# Patient Record
Sex: MALE | Race: ASIAN | NOT HISPANIC OR LATINO
[De-identification: names, ages, dates, MRNs, and addresses within clinical notes are randomized per-mention and may not be internally consistent; named-entity substitution may affect disease eponyms.]

---

## 2017-12-06 PROBLEM — Z00.00 ENCOUNTER FOR PREVENTIVE HEALTH EXAMINATION: Status: ACTIVE | Noted: 2017-12-06

## 2017-12-18 ENCOUNTER — APPOINTMENT (OUTPATIENT)
Dept: NUCLEAR MEDICINE | Facility: HOSPITAL | Age: 42
End: 2017-12-18
Payer: MEDICAID

## 2017-12-18 ENCOUNTER — OUTPATIENT (OUTPATIENT)
Dept: OUTPATIENT SERVICES | Facility: HOSPITAL | Age: 42
LOS: 1 days | End: 2017-12-18

## 2017-12-18 DIAGNOSIS — E05.90 THYROTOXICOSIS, UNSPECIFIED WITHOUT THYROTOXIC CRISIS OR STORM: ICD-10-CM

## 2017-12-19 ENCOUNTER — APPOINTMENT (OUTPATIENT)
Dept: NUCLEAR MEDICINE | Facility: HOSPITAL | Age: 42
End: 2017-12-19

## 2017-12-19 PROCEDURE — 78014 THYROID IMAGING W/BLOOD FLOW: CPT | Mod: 26

## 2018-01-03 ENCOUNTER — APPOINTMENT (OUTPATIENT)
Dept: NUCLEAR MEDICINE | Facility: HOSPITAL | Age: 43
End: 2018-01-03
Payer: MEDICAID

## 2018-01-03 ENCOUNTER — OUTPATIENT (OUTPATIENT)
Dept: OUTPATIENT SERVICES | Facility: HOSPITAL | Age: 43
LOS: 1 days | End: 2018-01-03

## 2018-01-03 DIAGNOSIS — E05.00 THYROTOXICOSIS WITH DIFFUSE GOITER WITHOUT THYROTOXIC CRISIS OR STORM: ICD-10-CM

## 2018-01-03 PROCEDURE — 79005 NUCLEAR RX ORAL ADMIN: CPT | Mod: 26

## 2018-04-24 ENCOUNTER — EMERGENCY (EMERGENCY)
Facility: HOSPITAL | Age: 43
LOS: 1 days | Discharge: HOME | End: 2018-04-24
Attending: EMERGENCY MEDICINE | Admitting: EMERGENCY MEDICINE
Payer: MEDICAID

## 2018-04-24 VITALS
RESPIRATION RATE: 16 BRPM | HEART RATE: 84 BPM | SYSTOLIC BLOOD PRESSURE: 144 MMHG | TEMPERATURE: 98 F | OXYGEN SATURATION: 100 % | DIASTOLIC BLOOD PRESSURE: 84 MMHG

## 2018-04-24 LAB
ALBUMIN SERPL ELPH-MCNC: 4.9 G/DL — SIGNIFICANT CHANGE UP (ref 3.3–5)
ALP SERPL-CCNC: 103 U/L — SIGNIFICANT CHANGE UP (ref 40–120)
ALT FLD-CCNC: 64 U/L — HIGH (ref 4–41)
APPEARANCE UR: CLEAR — SIGNIFICANT CHANGE UP
AST SERPL-CCNC: 48 U/L — HIGH (ref 4–40)
BACTERIA # UR AUTO: SIGNIFICANT CHANGE UP
BASOPHILS # BLD AUTO: 0.07 K/UL — SIGNIFICANT CHANGE UP (ref 0–0.2)
BASOPHILS NFR BLD AUTO: 0.8 % — SIGNIFICANT CHANGE UP (ref 0–2)
BILIRUB SERPL-MCNC: 0.5 MG/DL — SIGNIFICANT CHANGE UP (ref 0.2–1.2)
BILIRUB UR-MCNC: NEGATIVE — SIGNIFICANT CHANGE UP
BLOOD UR QL VISUAL: NEGATIVE — SIGNIFICANT CHANGE UP
BUN SERPL-MCNC: 10 MG/DL — SIGNIFICANT CHANGE UP (ref 7–23)
CALCIUM SERPL-MCNC: 9.3 MG/DL — SIGNIFICANT CHANGE UP (ref 8.4–10.5)
CHLORIDE SERPL-SCNC: 99 MMOL/L — SIGNIFICANT CHANGE UP (ref 98–107)
CK SERPL-CCNC: 627 U/L — HIGH (ref 30–200)
CO2 SERPL-SCNC: 26 MMOL/L — SIGNIFICANT CHANGE UP (ref 22–31)
COLOR SPEC: SIGNIFICANT CHANGE UP
CREAT SERPL-MCNC: 0.96 MG/DL — SIGNIFICANT CHANGE UP (ref 0.5–1.3)
EOSINOPHIL # BLD AUTO: 0.38 K/UL — SIGNIFICANT CHANGE UP (ref 0–0.5)
EOSINOPHIL NFR BLD AUTO: 4.5 % — SIGNIFICANT CHANGE UP (ref 0–6)
GLUCOSE SERPL-MCNC: 95 MG/DL — SIGNIFICANT CHANGE UP (ref 70–99)
GLUCOSE UR-MCNC: NEGATIVE — SIGNIFICANT CHANGE UP
HBA1C BLD-MCNC: 5.2 % — SIGNIFICANT CHANGE UP (ref 4–5.6)
HCT VFR BLD CALC: 49.1 % — SIGNIFICANT CHANGE UP (ref 39–50)
HGB BLD-MCNC: 16 G/DL — SIGNIFICANT CHANGE UP (ref 13–17)
IMM GRANULOCYTES # BLD AUTO: 0.02 # — SIGNIFICANT CHANGE UP
IMM GRANULOCYTES NFR BLD AUTO: 0.2 % — SIGNIFICANT CHANGE UP (ref 0–1.5)
KETONES UR-MCNC: NEGATIVE — SIGNIFICANT CHANGE UP
LEUKOCYTE ESTERASE UR-ACNC: NEGATIVE — SIGNIFICANT CHANGE UP
LIDOCAIN IGE QN: 23.2 U/L — SIGNIFICANT CHANGE UP (ref 7–60)
LYMPHOCYTES # BLD AUTO: 1.92 K/UL — SIGNIFICANT CHANGE UP (ref 1–3.3)
LYMPHOCYTES # BLD AUTO: 22.6 % — SIGNIFICANT CHANGE UP (ref 13–44)
MCHC RBC-ENTMCNC: 27.3 PG — SIGNIFICANT CHANGE UP (ref 27–34)
MCHC RBC-ENTMCNC: 32.6 % — SIGNIFICANT CHANGE UP (ref 32–36)
MCV RBC AUTO: 83.6 FL — SIGNIFICANT CHANGE UP (ref 80–100)
MONOCYTES # BLD AUTO: 0.52 K/UL — SIGNIFICANT CHANGE UP (ref 0–0.9)
MONOCYTES NFR BLD AUTO: 6.1 % — SIGNIFICANT CHANGE UP (ref 2–14)
NEUTROPHILS # BLD AUTO: 5.58 K/UL — SIGNIFICANT CHANGE UP (ref 1.8–7.4)
NEUTROPHILS NFR BLD AUTO: 65.8 % — SIGNIFICANT CHANGE UP (ref 43–77)
NITRITE UR-MCNC: NEGATIVE — SIGNIFICANT CHANGE UP
NRBC # FLD: 0 — SIGNIFICANT CHANGE UP
PH UR: 7 — SIGNIFICANT CHANGE UP (ref 4.6–8)
PLATELET # BLD AUTO: 307 K/UL — SIGNIFICANT CHANGE UP (ref 150–400)
PMV BLD: 10.8 FL — SIGNIFICANT CHANGE UP (ref 7–13)
POTASSIUM SERPL-MCNC: 4.1 MMOL/L — SIGNIFICANT CHANGE UP (ref 3.5–5.3)
POTASSIUM SERPL-SCNC: 4.1 MMOL/L — SIGNIFICANT CHANGE UP (ref 3.5–5.3)
PROT SERPL-MCNC: 8.2 G/DL — SIGNIFICANT CHANGE UP (ref 6–8.3)
PROT UR-MCNC: NEGATIVE MG/DL — SIGNIFICANT CHANGE UP
RBC # BLD: 5.87 M/UL — HIGH (ref 4.2–5.8)
RBC # FLD: 13.3 % — SIGNIFICANT CHANGE UP (ref 10.3–14.5)
RBC CASTS # UR COMP ASSIST: SIGNIFICANT CHANGE UP (ref 0–?)
SODIUM SERPL-SCNC: 137 MMOL/L — SIGNIFICANT CHANGE UP (ref 135–145)
SP GR SPEC: 1.01 — SIGNIFICANT CHANGE UP (ref 1–1.04)
SQUAMOUS # UR AUTO: SIGNIFICANT CHANGE UP
T3 SERPL-MCNC: < 30 NG/DL — LOW (ref 80–200)
T4 AB SER-ACNC: 0.57 UG/DL — LOW (ref 5.1–13)
TROPONIN T SERPL-MCNC: < 0.06 NG/ML — SIGNIFICANT CHANGE UP (ref 0–0.06)
TROPONIN T SERPL-MCNC: < 0.06 NG/ML — SIGNIFICANT CHANGE UP (ref 0–0.06)
TSH SERPL-MCNC: 89.71 UIU/ML — HIGH (ref 0.27–4.2)
UROBILINOGEN FLD QL: NORMAL MG/DL — SIGNIFICANT CHANGE UP
WBC # BLD: 8.49 K/UL — SIGNIFICANT CHANGE UP (ref 3.8–10.5)
WBC # FLD AUTO: 8.49 K/UL — SIGNIFICANT CHANGE UP (ref 3.8–10.5)
WBC UR QL: SIGNIFICANT CHANGE UP (ref 0–?)

## 2018-04-24 PROCEDURE — 71046 X-RAY EXAM CHEST 2 VIEWS: CPT | Mod: 26

## 2018-04-24 PROCEDURE — 99220: CPT | Mod: 25

## 2018-04-24 PROCEDURE — 93010 ELECTROCARDIOGRAM REPORT: CPT | Mod: 59

## 2018-04-24 RX ORDER — KETOROLAC TROMETHAMINE 30 MG/ML
15 SYRINGE (ML) INJECTION ONCE
Qty: 0 | Refills: 0 | Status: DISCONTINUED | OUTPATIENT
Start: 2018-04-24 | End: 2018-04-24

## 2018-04-24 RX ORDER — PROPRANOLOL HCL 160 MG
0 CAPSULE, EXTENDED RELEASE 24HR ORAL
Qty: 0 | Refills: 0 | COMMUNITY

## 2018-04-24 RX ORDER — AMLODIPINE BESYLATE 2.5 MG/1
0 TABLET ORAL
Qty: 0 | Refills: 0 | COMMUNITY

## 2018-04-24 RX ORDER — ASPIRIN/CALCIUM CARB/MAGNESIUM 324 MG
162 TABLET ORAL ONCE
Qty: 0 | Refills: 0 | Status: COMPLETED | OUTPATIENT
Start: 2018-04-24 | End: 2018-04-24

## 2018-04-24 RX ADMIN — Medication 162 MILLIGRAM(S): at 08:38

## 2018-04-24 RX ADMIN — Medication 15 MILLIGRAM(S): at 08:50

## 2018-04-24 RX ADMIN — Medication 15 MILLIGRAM(S): at 08:38

## 2018-04-24 NOTE — ED CDU PROVIDER INITIAL DAY NOTE - ATTENDING CONTRIBUTION TO CARE
I performed a face to face bedside interview with patient regarding history of present illness, review of symptoms and past medical history. I completed an independent physical exam.  I have discussed patient's plan of care with PA.   I agree with note as stated above, having amended the EMR as needed to reflect my findings. I have discussed the assessment and plan of care.  This includes during the time I functioned as the attending physician for this patient.    ATTENDING PHYSICAL EXAM IS DOCUMENTED IN THE BODY OF THE NOTE.

## 2018-04-24 NOTE — ED PROVIDER NOTE - CONSTITUTIONAL, MLM
Well appearing, well nourished, awake, alert, oriented to person, place, time/situation and in no apparent distress. normal... well nourished, awake, alert, oriented to person, place, time/situation and in no apparent distress.

## 2018-04-24 NOTE — ED PROVIDER NOTE - MEDICAL DECISION MAKING DETAILS
-impression: symptoms likely due to MSK pain. heart score 2 if trop wnl. pt PERC's out. will send to cdu for stress test since pt of [place] origin with atypical cp  -initial plan: labs, EKG, CXR, symptomatic treatment w/ analgesics, IVF -reassess, -consider cdu for stress test since pt of [place] origin with atypical cp -impression: atypical cp likely due to MSK pain. heart score 2 if trop wnl. pt PERC's out, wells score 0. bp 139/105 L arm, bp R arm 140/100 R arm.   -initial plan: labs, EKG, CXR, symptomatic treatment w/ analgesics, IVF -reassess, -consider cdu for stress test since pt of [place] origin with atypical cp

## 2018-04-24 NOTE — ED PROVIDER NOTE - OBJECTIVE STATEMENT
41yo M pmh htn, hyperthyroidism p/w high blood pressure. pt was sleeping, woke up diaphoretic with R sided paracervical pain & L sided cp. checked his bp & it was 137/97 (baseline bp 129/87) so came to ED. for past 1 year pt has been having L sided cp, intermittent. Cp is not pleuritic, not affected by exertion. denies f/c, cough, rhinorrhea, sob, hx dvt/pe, fhx cardiac dz.

## 2018-04-24 NOTE — ED CDU PROVIDER INITIAL DAY NOTE - OBJECTIVE STATEMENT
43yo M with PMH significant for htn, hyperthyroidism presented to ER concerned about BP but also revealed that he has had L sided chest pain off and on for past year, non radiating.  No cough, no shortness of breath, no fever/chills, no other complaints.

## 2018-04-24 NOTE — ED PROVIDER NOTE - ATTENDING CONTRIBUTION TO CARE
42M c/o cp, diaphoresis, slight elevation in bp  -agree with hx and pe as above documented by Dr Zeng  -given Mr Syed's age, hx htn, and [place] ethnicity I think he needs obs for stress, serial ce, arrythmia monitoring to r/o MACE.

## 2018-04-24 NOTE — ED CDU PROVIDER INITIAL DAY NOTE - PROGRESS NOTE DETAILS
Pt signed out to me by CLAUDIA Vides: 42yM w/pmhx HTN, hyperthyroid presented to ED with concern for elevated BP at home with b/l chest pain. Pt reports hx of left sided chest pain for the past year intermittenly. Currently resting in bed, VSS, no events on tele, no chest pain at this time, no other complaints. Will continue to monitor pt, pending stress test in AM

## 2018-04-24 NOTE — ED ADULT NURSE NOTE - OBJECTIVE STATEMENT
Pt a&ox3 c/o Lt sided chest pain 5/10 pain, squeezing, denies sob, denies trauma, heavy lifting, breathing even and unlabored, no headache/dizziness, abd soft, non-tender, non-distended. Skin is cool dry and intact. IVL placed, labs sent. WIll continue to monitor.

## 2018-04-25 VITALS
DIASTOLIC BLOOD PRESSURE: 78 MMHG | OXYGEN SATURATION: 100 % | TEMPERATURE: 98 F | HEART RATE: 72 BPM | SYSTOLIC BLOOD PRESSURE: 134 MMHG | RESPIRATION RATE: 16 BRPM

## 2018-04-25 LAB — CK SERPL-CCNC: 461 U/L — HIGH (ref 30–200)

## 2018-04-25 PROCEDURE — 99217: CPT

## 2018-04-25 PROCEDURE — 93018 CV STRESS TEST I&R ONLY: CPT | Mod: GC

## 2018-04-25 PROCEDURE — 93016 CV STRESS TEST SUPVJ ONLY: CPT | Mod: GC

## 2018-04-25 PROCEDURE — 78452 HT MUSCLE IMAGE SPECT MULT: CPT | Mod: 26

## 2018-04-25 RX ORDER — SODIUM CHLORIDE 9 MG/ML
1000 INJECTION INTRAMUSCULAR; INTRAVENOUS; SUBCUTANEOUS ONCE
Qty: 0 | Refills: 0 | Status: COMPLETED | OUTPATIENT
Start: 2018-04-25 | End: 2018-04-25

## 2018-04-25 RX ORDER — ACETAMINOPHEN 500 MG
650 TABLET ORAL ONCE
Qty: 0 | Refills: 0 | Status: DISCONTINUED | OUTPATIENT
Start: 2018-04-25 | End: 2018-04-25

## 2018-04-25 RX ORDER — ACETAMINOPHEN 500 MG
650 TABLET ORAL ONCE
Qty: 0 | Refills: 0 | Status: COMPLETED | OUTPATIENT
Start: 2018-04-25 | End: 2018-04-25

## 2018-04-25 RX ADMIN — Medication 650 MILLIGRAM(S): at 07:17

## 2018-04-25 RX ADMIN — SODIUM CHLORIDE 1000 MILLILITER(S): 9 INJECTION INTRAMUSCULAR; INTRAVENOUS; SUBCUTANEOUS at 10:22

## 2018-04-25 RX ADMIN — Medication 650 MILLIGRAM(S): at 06:17

## 2018-04-25 NOTE — ED CDU PROVIDER SUBSEQUENT DAY NOTE - MEDICAL DECISION MAKING DETAILS
42yM w/pmhx HTN and hyperthyroid presented to the ED with concern for elevated blood pressure and intermittent left sided chest pain x 1 year. Two sets negative trop, no events on tele, pending stress test in the morning.

## 2018-04-25 NOTE — ED CDU PROVIDER DISPOSITION NOTE - PLAN OF CARE
You were seen in the ED for chest pain. Your nuclear stress test was normal. (1) Please take your medications regularly, keep a log of your blood pressure, and follow-up with Dr. Acuna within the next 1 wk. Bring your test results and blood pressure log. (2) Please follow-up with your Endocrinologist. Please take your test results with you and discuss whether you should continue the Propanolol medication following the thyroid procedure.  (3) Seek immediate medical care for any new/worsening signs or symptoms.

## 2018-04-25 NOTE — ED CDU PROVIDER DISPOSITION NOTE - CLINICAL COURSE
17:08 Pasquale att: 42 [place] male h/o htn, hyperthryoid 1 mo s/p radio ablation, p/w bilateral cp, CDU for tele, CE#2 and nuc stress test. Past one year patient notes intermitt left sided chest pain, occassional radiation to right side chest. Non-exertional. Two nights before patient was asleep when he noted left sided chest pain, neg sob, took his bp which he noted to be high, and gave himself amlodipine 2.5 mg po. Of note patient was prescribed propanolol TID for hyperthryoidism, dose not discontinued since radioablation, patient denies cp associated with taking propanolol post ablation. PE nad, aaox3, ctabl, rrr, s1s2, abd soft ntnd, neg le edema. Labs significant for elevated ck that cleared with fluids, 2 negative troponins. Nuc stress negative. Per patient request, [facility] left with [facility] to facilitate prompt 1-2 w follow-up. Results, dc instructions, return precautions, and need for follow-up reviewed with patient. Stable dc home.

## 2018-04-25 NOTE — ED CDU PROVIDER SUBSEQUENT DAY NOTE - HISTORY
42yM w/pmhx HTN, hyperthyroid presented to ED with concern for elevated BP at home with b/l chest pain. Pt reports hx of left sided chest pain for the past year intermittently, non-radiating. Yesterday morning he woke up sweaty with chest pain.   Currently resting in bed, VSS, no events on tele, no chest pain at this time, no other complaints. Will continue to monitor pt, pending stress test in AM.

## 2018-04-25 NOTE — ED CDU PROVIDER SUBSEQUENT DAY NOTE - ATTENDING CONTRIBUTION TO CARE
Dr. Giordano: I performed a face to face bedside interview with patient regarding history of present illness, review of symptoms and past medical history. I completed an independent physical exam.  I have discussed patient's plan of care with PA.   I agree with note as stated above, having amended the EMR as needed to reflect my findings.   This includes HISTORY OF PRESENT ILLNESS, HIV, PAST MEDICAL/SURGICAL/FAMILY/SOCIAL HISTORY, ALLERGIES AND HOME MEDICATIONS, REVIEW OF SYSTEMS, PHYSICAL EXAM, and any PROGRESS NOTES during the time I functioned as the attending physician for this patient. 42 [place]  male h/o htn, hyperthryoid 1 mo s/p radio ablation, p/w bilateral cp, CDU for tele, CE#2 and nuc stress test. Past one year patient notes intermitt left sided chest pain, occassional radiation to right side chest. Non-exertional. Two nights before patient was asleep when he noted left sided chest pain, neg sob, took his bp which he noted to be high, and gave himself amlodipine 2.5 mg po. Of note patient was prescribed propanolol TID for hyperthryoidism, dose not discontinued since radioablation, patient denies cp associated with taking propanolol post ablation. PE nad, aaox3, ctabl, rrr, s1s2, abd soft ntnd, neg le edema.

## 2020-01-03 ENCOUNTER — EMERGENCY (EMERGENCY)
Facility: HOSPITAL | Age: 45
LOS: 1 days | Discharge: HOME | End: 2020-01-03
Attending: EMERGENCY MEDICINE | Admitting: EMERGENCY MEDICINE
Payer: MEDICAID

## 2020-01-03 VITALS
RESPIRATION RATE: 18 BRPM | OXYGEN SATURATION: 100 % | DIASTOLIC BLOOD PRESSURE: 109 MMHG | SYSTOLIC BLOOD PRESSURE: 153 MMHG | HEART RATE: 88 BPM | TEMPERATURE: 98 F

## 2020-01-03 PROCEDURE — 99283 EMERGENCY DEPT VISIT LOW MDM: CPT

## 2020-01-03 NOTE — ED ADULT TRIAGE NOTE - CHIEF COMPLAINT QUOTE
Pt. c/o HTN, took his blood pressure at home and was "135/100," saw his PMD yesterday, given 12.5mg hydrochlorothiazide and took it this morning around 6 am. Endorses intermittent generalized headache. Denies vision changes. PMH: HTN.

## 2020-01-04 VITALS
DIASTOLIC BLOOD PRESSURE: 95 MMHG | TEMPERATURE: 98 F | RESPIRATION RATE: 16 BRPM | OXYGEN SATURATION: 100 % | SYSTOLIC BLOOD PRESSURE: 138 MMHG | HEART RATE: 96 BPM

## 2020-01-04 PROBLEM — E05.90 THYROTOXICOSIS, UNSPECIFIED WITHOUT THYROTOXIC CRISIS OR STORM: Chronic | Status: ACTIVE | Noted: 2018-04-24

## 2020-01-04 PROBLEM — I10 ESSENTIAL (PRIMARY) HYPERTENSION: Chronic | Status: ACTIVE | Noted: 2018-04-24

## 2020-01-04 NOTE — ED PROVIDER NOTE - PATIENT PORTAL LINK FT
You can access the FollowMyHealth Patient Portal offered by [facility] by registering at the following website: [email or web address]. By joining [employer]’s FollowMyHealth portal, you will also be able to view your health information using other applications (apps) compatible with our system.

## 2020-01-04 NOTE — ED PROVIDER NOTE - OBJECTIVE STATEMENT
44 yr old M c non contrib PMHx who p/w concern for elevated BP.  States SBP at home was 135 mmHg so he came in.  Went to PMD yesterday and was started on HCTZ for SBPs around 140 at home. Has never been on any other BP meds.  Is otherwise well.  No SOB, no CP, no HA.  No fever/chills. No N/V/D. No edema.

## 2020-01-04 NOTE — ED ADULT NURSE NOTE - OBJECTIVE STATEMENT
Patient received to room 8, ambulatory, c/o hypertension. Primarily seen by MD Bourne. Vital signs stable. Patient discharged by MD.

## 2020-01-04 NOTE — ED PROVIDER NOTE - CLINICAL SUMMARY MEDICAL DECISION MAKING FREE TEXT BOX
Pt pw concern for mildly elevated BP.  Is asymptomatic.  Has no focal findings on exam.  I advised pt to f/u c PMD and cotinue taking meds.  Return precautions discussed.

## 2020-01-04 NOTE — ED PROVIDER NOTE - NSFOLLOWUPINSTRUCTIONS_ED_ALL_ED_FT
Please continue taking all medications as prescribed.   Measure blood pressure not more than once a day, try to do it at the same time every day, and keep a log.     See your doctor for follow up.  Stop taking your blood pressure medicine if you feel dizzy, lightheaded.     Return to the ER for severe pain, vomiting, fevers, chest pain, or other concerning signs